# Patient Record
Sex: MALE | Race: WHITE | NOT HISPANIC OR LATINO | Employment: UNEMPLOYED | URBAN - METROPOLITAN AREA
[De-identification: names, ages, dates, MRNs, and addresses within clinical notes are randomized per-mention and may not be internally consistent; named-entity substitution may affect disease eponyms.]

---

## 2017-03-30 ENCOUNTER — OFFICE VISIT (OUTPATIENT)
Dept: INTERNAL MEDICINE | Facility: CLINIC | Age: 62
End: 2017-03-30

## 2017-03-30 VITALS
DIASTOLIC BLOOD PRESSURE: 76 MMHG | SYSTOLIC BLOOD PRESSURE: 112 MMHG | OXYGEN SATURATION: 98 % | RESPIRATION RATE: 16 BRPM | WEIGHT: 246.13 LBS | HEART RATE: 79 BPM | TEMPERATURE: 97.3 F

## 2017-03-30 DIAGNOSIS — Z11.59 NEED FOR HEPATITIS C SCREENING TEST: ICD-10-CM

## 2017-03-30 DIAGNOSIS — Z76.89 ENCOUNTER TO ESTABLISH CARE: Primary | ICD-10-CM

## 2017-03-30 DIAGNOSIS — I10 ESSENTIAL HYPERTENSION: ICD-10-CM

## 2017-03-30 DIAGNOSIS — Z13.220 LIPID SCREENING: ICD-10-CM

## 2017-03-30 LAB
ALBUMIN SERPL-MCNC: 4.4 G/DL (ref 3.2–4.8)
ALBUMIN/GLOB SERPL: 1.6 G/DL (ref 1.5–2.5)
ALP SERPL-CCNC: 48 U/L (ref 25–100)
ALT SERPL W P-5'-P-CCNC: 31 U/L (ref 7–40)
ANION GAP SERPL CALCULATED.3IONS-SCNC: 6 MMOL/L (ref 3–11)
ARTICHOKE IGE QN: 132 MG/DL (ref 0–130)
AST SERPL-CCNC: 37 U/L (ref 0–33)
BILIRUB SERPL-MCNC: 0.6 MG/DL (ref 0.3–1.2)
BUN BLD-MCNC: 7 MG/DL (ref 9–23)
BUN/CREAT SERPL: 11.7 (ref 7–25)
CALCIUM SPEC-SCNC: 9.9 MG/DL (ref 8.7–10.4)
CHLORIDE SERPL-SCNC: 104 MMOL/L (ref 99–109)
CHOLEST SERPL-MCNC: 199 MG/DL (ref 0–200)
CO2 SERPL-SCNC: 29 MMOL/L (ref 20–31)
CREAT BLD-MCNC: 0.6 MG/DL (ref 0.6–1.3)
DEPRECATED RDW RBC AUTO: 55.4 FL (ref 37–54)
ERYTHROCYTE [DISTWIDTH] IN BLOOD BY AUTOMATED COUNT: 13.5 % (ref 11.3–14.5)
GFR SERPL CREATININE-BSD FRML MDRD: 137 ML/MIN/1.73
GLOBULIN UR ELPH-MCNC: 2.7 GM/DL
GLUCOSE BLD-MCNC: 109 MG/DL (ref 70–100)
HCT VFR BLD AUTO: 46.4 % (ref 38.9–50.9)
HCV AB SER DONR QL: NORMAL
HDLC SERPL-MCNC: 38 MG/DL (ref 40–60)
HGB BLD-MCNC: 16.1 G/DL (ref 13.1–17.5)
MCH RBC QN AUTO: 39 PG (ref 27–31)
MCHC RBC AUTO-ENTMCNC: 34.7 G/DL (ref 32–36)
MCV RBC AUTO: 112.3 FL (ref 80–99)
PLATELET # BLD AUTO: 195 10*3/MM3 (ref 150–450)
PMV BLD AUTO: 8.6 FL (ref 6–12)
POTASSIUM BLD-SCNC: 3.9 MMOL/L (ref 3.5–5.5)
PROT SERPL-MCNC: 7.1 G/DL (ref 5.7–8.2)
RBC # BLD AUTO: 4.13 10*6/MM3 (ref 4.2–5.76)
SODIUM BLD-SCNC: 139 MMOL/L (ref 132–146)
T4 FREE SERPL-MCNC: 0.93 NG/DL (ref 0.89–1.76)
TRIGL SERPL-MCNC: 256 MG/DL (ref 0–150)
TSH SERPL DL<=0.05 MIU/L-ACNC: 1.81 MIU/ML (ref 0.35–5.35)
WBC NRBC COR # BLD: 8.36 10*3/MM3 (ref 3.5–10.8)

## 2017-03-30 PROCEDURE — 85027 COMPLETE CBC AUTOMATED: CPT | Performed by: INTERNAL MEDICINE

## 2017-03-30 PROCEDURE — 86803 HEPATITIS C AB TEST: CPT | Performed by: INTERNAL MEDICINE

## 2017-03-30 PROCEDURE — 84439 ASSAY OF FREE THYROXINE: CPT | Performed by: INTERNAL MEDICINE

## 2017-03-30 PROCEDURE — 99204 OFFICE O/P NEW MOD 45 MIN: CPT | Performed by: INTERNAL MEDICINE

## 2017-03-30 PROCEDURE — 80053 COMPREHEN METABOLIC PANEL: CPT | Performed by: INTERNAL MEDICINE

## 2017-03-30 PROCEDURE — 36415 COLL VENOUS BLD VENIPUNCTURE: CPT | Performed by: INTERNAL MEDICINE

## 2017-03-30 PROCEDURE — 80061 LIPID PANEL: CPT | Performed by: INTERNAL MEDICINE

## 2017-03-30 PROCEDURE — 84443 ASSAY THYROID STIM HORMONE: CPT | Performed by: INTERNAL MEDICINE

## 2017-03-30 RX ORDER — RANITIDINE 150 MG/1
150 TABLET ORAL 2 TIMES DAILY
COMMUNITY
Start: 2013-01-30

## 2017-03-30 RX ORDER — METOPROLOL TARTRATE 50 MG/1
25 TABLET, FILM COATED ORAL 2 TIMES DAILY
COMMUNITY

## 2017-03-30 RX ORDER — WARFARIN SODIUM 5 MG/1
5 TABLET ORAL
COMMUNITY

## 2017-03-30 RX ORDER — ASPIRIN 81 MG/1
81 TABLET, CHEWABLE ORAL DAILY
COMMUNITY

## 2017-03-30 RX ORDER — ACETAMINOPHEN 500 MG
500 TABLET ORAL EVERY 6 HOURS PRN
COMMUNITY

## 2017-03-30 RX ORDER — AMLODIPINE BESYLATE AND BENAZEPRIL HYDROCHLORIDE 10; 20 MG/1; MG/1
1 CAPSULE ORAL DAILY
COMMUNITY

## 2017-03-30 NOTE — PROGRESS NOTES
Subjective   Héctor Camargo is a 61 y.o. male.     History of Present Illness     Establish visit    1 HTN- chronic and stable.  Patient says his blood pressures been well-controlled she's had no side effects from medication.  Patient denies any shorts breath, chest pain, nausea, vomiting, diarrhea, or any other systemic symptoms.  Medications: Patient is currently taking the metoprolol 25 mg by mouth twice a day and amlodipine/benazepril 10/20 one tablet by mouth once a day.    2 GERD- chronic and stable.  Patient says that he's had no recent abdominal pain, reflux, heartburn, or any other systemic symptoms.  Patient denies any fever, chills, nausea, vomiting as a pertains to his GERD.  Medications: Patient is currently on an continues on the ranitidine 150 mg by mouth twice a day.      Past Medical History  DVT in right leg in 96  Had a recurrence with being off of the medication for 1 month.    Family history: Noncontributory.    Social History: Cigarette smoking, + EtOH, IV drug use, no marijuana    Review of Systems   Constitutional: Negative for appetite change and fatigue.   Respiratory: Negative for cough and chest tightness.    Cardiovascular: Negative for chest pain, palpitations and leg swelling.   Gastrointestinal: Negative for constipation, diarrhea and nausea.   Neurological: Negative for weakness, light-headedness and headaches.   All other systems reviewed and are negative.      Objective   Physical Exam   Constitutional: He is oriented to person, place, and time. He appears well-developed and well-nourished.   HENT:   Head: Normocephalic.   Right Ear: External ear normal.   Left Ear: External ear normal.   Nose: Nose normal.   Mouth/Throat: Oropharynx is clear and moist.   Eyes: Conjunctivae and EOM are normal. Pupils are equal, round, and reactive to light.   Neck: Normal range of motion. Neck supple.   Cardiovascular: Normal rate, regular rhythm and normal heart sounds.    Pulmonary/Chest: Effort  normal and breath sounds normal.   Abdominal: Soft. Bowel sounds are normal.   Neurological: He is alert and oriented to person, place, and time.   Nursing note and vitals reviewed.      Assessment/Plan   Héctor was seen today for establish care.    Diagnoses and all orders for this visit:    Encounter to establish care  -     Comprehensive Metabolic Panel  -     T4, Free    Lipid screening  -     Lipid Panel    Need for hepatitis C screening test  -     Hepatitis C Antibody    Essential hypertension  -     CBC (No Diff)  -     continue on current blood pressure medications         Counseled about blood pressure management and watching diet         Encourage exercise

## 2017-04-02 DIAGNOSIS — R71.8 ELEVATED MCV: Primary | ICD-10-CM

## 2017-09-13 ENCOUNTER — TELEPHONE (OUTPATIENT)
Dept: INTERNAL MEDICINE | Facility: CLINIC | Age: 62
End: 2017-09-13

## 2017-09-13 NOTE — TELEPHONE ENCOUNTER
Patient had pending labs back from 4-2-17 , I mailed a lab reminder letter back on 8-2-17 patient still has not returned for this would you like to d/c order?

## 2017-09-13 NOTE — TELEPHONE ENCOUNTER
----- Message from Gina Najera sent at 9/13/2017 11:31 AM EDT -----  9-13-17 @ 11:31 am sent message to provider     ----- Message -----     From: SYSTEM     Sent: 4/12/2017  12:07 AM       To: Mge Pc [facility]

## 2022-12-11 ENCOUNTER — APPOINTMENT (OUTPATIENT)
Dept: GENERAL RADIOLOGY | Facility: HOSPITAL | Age: 67
End: 2022-12-11

## 2022-12-11 ENCOUNTER — HOSPITAL ENCOUNTER (EMERGENCY)
Facility: HOSPITAL | Age: 67
Discharge: HOME | End: 2022-12-11
Attending: EMERGENCY MEDICINE | Admitting: EMERGENCY MEDICINE

## 2022-12-11 ENCOUNTER — APPOINTMENT (OUTPATIENT)
Dept: CT IMAGING | Facility: HOSPITAL | Age: 67
End: 2022-12-11

## 2022-12-11 VITALS
TEMPERATURE: 97.5 F | HEIGHT: 74 IN | OXYGEN SATURATION: 100 % | DIASTOLIC BLOOD PRESSURE: 80 MMHG | WEIGHT: 210 LBS | RESPIRATION RATE: 16 BRPM | HEART RATE: 80 BPM | SYSTOLIC BLOOD PRESSURE: 118 MMHG | BODY MASS INDEX: 26.95 KG/M2

## 2022-12-11 DIAGNOSIS — S22.060A COMPRESSION FRACTURE OF T8 VERTEBRA, INITIAL ENCOUNTER: Primary | ICD-10-CM
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
## 2022-12-11 LAB
INR PPP: 3 (ref 0.9–1.1)
PROTHROMBIN TIME: 32.3 SECONDS

## 2022-12-11 PROCEDURE — 72125 CT NECK SPINE W/O DYE: CPT

## 2022-12-11 PROCEDURE — 72040 X-RAY EXAM NECK SPINE 2-3 VW: CPT

## 2022-12-11 PROCEDURE — 99283 EMERGENCY DEPT VISIT LOW MDM: CPT

## 2022-12-11 PROCEDURE — 85610 PROTHROMBIN TIME: CPT

## 2022-12-11 PROCEDURE — 36415 COLL VENOUS BLD VENIPUNCTURE: CPT

## 2022-12-11 PROCEDURE — 71111 X-RAY EXAM RIBS/CHEST4/> VWS: CPT

## 2022-12-11 PROCEDURE — 72100 X-RAY EXAM L-S SPINE 2/3 VWS: CPT

## 2022-12-11 PROCEDURE — 72072 X-RAY EXAM THORAC SPINE 3VWS: CPT

## 2022-12-11 RX ORDER — FAMOTIDINE 20 MG/1
20 TABLET, FILM COATED ORAL 2 TIMES DAILY
COMMUNITY

## 2022-12-11 RX ORDER — MULTIPLE VITAMINS W/ MINERALS TAB 9MG-400MCG
1 TAB ORAL DAILY
COMMUNITY

## 2022-12-11 RX ORDER — CETIRIZINE HYDROCHLORIDE 5 MG/1
5 TABLET ORAL DAILY
COMMUNITY

## 2022-12-11 RX ORDER — HYDROCODONE BITARTRATE AND ACETAMINOPHEN 7.5; 325 MG/1; MG/1
1 TABLET ORAL EVERY 6 HOURS PRN
Qty: 12 TABLET | Refills: 0 | Status: SHIPPED | OUTPATIENT
Start: 2022-12-11

## 2022-12-11 RX ORDER — HYDROCODONE BITARTRATE AND ACETAMINOPHEN 5; 325 MG/1; MG/1
1 TABLET ORAL ONCE
Status: COMPLETED | OUTPATIENT
Start: 2022-12-11 | End: 2022-12-11

## 2022-12-11 RX ADMIN — HYDROCODONE BITARTRATE AND ACETAMINOPHEN 1 TABLET: 5; 325 TABLET ORAL at 15:46

## 2022-12-11 RX ADMIN — HYDROCODONE BITARTRATE AND ACETAMINOPHEN 1 TABLET: 5; 325 TABLET ORAL at 14:40

## 2022-12-11 NOTE — DISCHARGE INSTRUCTIONS
Rest, no strenuous activity.  Use caution when ambulating around the home.  Follow-up with the patient connection listed above to establish a primary care provider if necessary.  Return to the emergency department immediately if any change or worsening of symptoms.

## 2022-12-11 NOTE — ED PROVIDER NOTES
" EMERGENCY DEPARTMENT ENCOUNTER    Pt Name: Héctor Camargo  MRN: [id number]  Pt :   1955  Room Number:  24SF/24  Date of encounter:  2022  PCP: Demetrius Padron MD  ED Provider: Aditya So PA-C    Historian: Patient      HPI:  Chief Complaint: Slip/fall at home, landed on back, complains of mid back pain, rib pain, and neck pain        Context: Héctor Camargo is a 67 y.o. male who presents to the ED c/o slip/fall at home, states his feet \"went out from under me\", causing him to fall directly onto his mid back, he complains to the mid back low back and neck, with pain radiating around posterior ribs into his anterior chest.  No shortness of breath but he does have some pain with deep breathing.  No hemoptysis.  No head injury.  No hip or pelvis injury.  He denies lower extremity injury.  He denies other symptoms or injuries.  He is anticoagulated with warfarin for life after being diagnosed with a DVT in his right lower extremity 20 years ago..      PAST MEDICAL HISTORY  Past Medical History:   Diagnosis Date   • Hypertension          PAST SURGICAL HISTORY  Past Surgical History:   Procedure Laterality Date   • HERNIA REPAIR           FAMILY HISTORY  History reviewed. No pertinent family history.      SOCIAL HISTORY  Social History     Socioeconomic History   • Marital status:    Tobacco Use   • Smoking status: Every Day     Packs/day: 1.00     Types: Cigarettes     Start date: 1970   Substance and Sexual Activity   • Alcohol use: Yes     Alcohol/week: 19.0 standard drinks     Types: 15 Cans of beer, 4 Shots of liquor per week     Comment: DAILY   • Drug use: Yes     Types: Marijuana     Comment: occasional          ALLERGIES  Patient has no known allergies.        REVIEW OF SYSTEMS  Review of Systems   Constitutional: Positive for activity change. Negative for appetite change, chills, fatigue and fever.   HENT: Negative for congestion, rhinorrhea and sore throat.    Eyes: Negative " for photophobia and visual disturbance.   Respiratory: Negative for cough, shortness of breath and wheezing.    Cardiovascular: Negative for chest pain and palpitations.   Gastrointestinal: Negative for abdominal pain, nausea and vomiting.   Genitourinary: Negative for dysuria, flank pain and hematuria.   Musculoskeletal: Positive for arthralgias, back pain and neck pain. Negative for gait problem and myalgias.   Neurological: Negative for dizziness, seizures, syncope and headaches.          All systems reviewed and negative except for those discussed in HPI.       PHYSICAL EXAM    I have reviewed the triage vital signs and nursing notes.    ED Triage Vitals [12/11/22 1410]   Temp Heart Rate Resp BP SpO2   97.6 °F (36.4 °C) 78 18 121/82 99 %      Temp src Heart Rate Source Patient Position BP Location FiO2 (%)   Oral Monitor Sitting Left arm --       Physical Exam  GENERAL:   Appears uncomfortable, but not toxic appearing and not in acute distress.  He is ambulatory to bedside with normal gait.  HENT: Nares patent.  No facial asymmetry, head is normocephalic and atraumatic.  Neck is supple without midline tenderness crepitus or step-off.  He complains of general soreness to the bilateral paracervical area.  Lungs are clear to auscultation with no tachypnea or respiratory distress or excess or muscle use.  EYES: No scleral icterus.  CV: Regular rhythm, regular rate.  RESPIRATORY: Normal effort.  No audible wheezes, rales or rhonchi.  ABDOMEN: Soft, nontender  MUSCULOSKELETAL: No deformities.  Back without external sign of injury.  He is tender around T8, and has tenderness to bilateral posterior ribs diffusely.  Thoracic expansion is normal no tachypnea or respiratory distress or accessory muscle use.    NEURO: Alert, moves all extremities, follows commands.  Chart SKIN: Warm, dry, no rash visualized.        LAB RESULTS  No results found for this or any previous visit (from the past 24 hour(s)).    If labs were  ordered, I independently reviewed the results.        RADIOLOGY  XR CERVICAL SPINE 2 VIEW  XR THORACIC SPINE 3 VIEW  IMPRESSION:  There is retrolisthesis of C5 on C6 with questionable cortical  irregularity seen involving the C6 facet. Consider CT of the cervical  spine to further evaluate.     Age-indeterminate ventral height loss involving the T8 vertebral body.  Correlate with point tenderness. There is minimal height loss and no  significant bony retropulsion or malalignment.     Lumbar spondylosis is present without evidence of fracture or traumatic  malalignment.     No acute cardiopulmonary abnormality. No evidence of displaced acute rib  fracture.        DATE OF EXAM: 12/11/2022 4:55 PM     PROCEDURE: CT CERVICAL SPINE WO CONTRAST-     INDICATIONS: Fall at home, retrolisthesis of C5 on C6, abnormal C6 facet  appearance on x-ray     COMPARISON: Radiographs earlier the same day     TECHNIQUE: Routine transaxial slices were obtained through the cervical  spine without the administration of intravenous contrast. Reconstructed  coronal and sagittal images were also obtained. Automated exposure  control and iterative construction methods were used.      The radiation dose reduction device was turned on for each scan per the  ALARA (As Low as Reasonably Achievable) protocol.     FINDINGS:  Vertebral body heights are maintained and cortical margins are intact,  including at the C5-6 facet, without evidence of acute fracture. There  is redemonstrated mild retrolisthesis of C5 on C6. Multilevel  spondylosis change is apparent, with areas of disc osteophyte complex  formation and facet arthropathy, appearing most advanced at C5-6,  without definite evidence of high-grade canal narrowing. The paraspinal  soft tissues demonstrate no acute findings. The lung apices demonstrate  extensive emphysema.     IMPRESSION:  Multilevel cervical spondylosis is present, without evidence of acute  fracture or traumatic malalignment.  C5 on C6 retrolisthesis noted on  radiographs is likely spondylotic.     This report was finalized on 12/11/2022 5:22 PM by Ray Jorgensen.          PROCEDURES    Procedures    No orders to display       MEDICATIONS GIVEN IN ER    Medications   HYDROcodone-acetaminophen (NORCO) 5-325 MG per tablet 1 tablet (1 tablet Oral Given 12/11/22 1440)   HYDROcodone-acetaminophen (NORCO) 5-325 MG per tablet 1 tablet (1 tablet Oral Given 12/11/22 1546)         PROGRESS, DATA ANALYSIS, CONSULTS, AND MEDICAL DECISION MAKING    All labs have been independently reviewed by me.  All radiology studies have been reviewed by me and the radiologist dictating the report.   EKG's have been independently viewed and interpreted by me.                     AS OF 22:58 EST VITALS:    BP - 118/80  HR - 80  TEMP - 97.5 °F (36.4 °C) (Oral)  O2 SATS - 100%                  DIAGNOSIS  Final diagnoses:   Compression fracture of T8 vertebra, initial encounter (HCC)   Fall at home, initial encounter         DISPOSITION  DISCHARGE    Patient discharged in stable condition.    Reviewed implications of results, diagnosis, meds, responsibility to follow up, warning signs and symptoms of possible worsening, potential complications and reasons to return to ER.    Patient/Family voiced understanding of above instructions.    Discussed plan for discharge, as there is no emergent indication for admission.  Pt/family is agreeable and understands need for follow up and possible repeat testing.  Pt/family is aware that discharge does not mean that nothing is wrong but that it indicates no emergency is currently present that requires admission and they must continue care with follow-up as given below or with a physician of their choice.     FOLLOW-UP  Demetrius Padron MD  [address] [place] [address] [address]  [facility] [address] [phone]    Schedule an appointment as soon as possible for a visit            Medication List      New Prescriptions     HYDROcodone-acetaminophen 7.5-325 MG per tablet  Commonly known as: NORCO  Take 1 tablet by mouth Every 6 (Six) Hours As Needed for Moderate Pain.           Where to Get Your Medications      These medications were sent to [facility] [id number] - [place], [place] - [address] [place] [address] AT [address] - [phone]  - [phone] [address] [address]    Phone: [phone]   · HYDROcodone-acetaminophen 7.5-325 MG per tablet                  Aditya So PA-C  12/14/22 0832

## 2022-12-12 LAB
INR PPP: 2.8 (ref 0.8–1.2)
PROTHROMBIN TIME: 32.3 SECONDS (ref 12.8–15.2)